# Patient Record
Sex: MALE | Race: WHITE | NOT HISPANIC OR LATINO | Employment: OTHER | RURAL
[De-identification: names, ages, dates, MRNs, and addresses within clinical notes are randomized per-mention and may not be internally consistent; named-entity substitution may affect disease eponyms.]

---

## 2021-12-21 DIAGNOSIS — M54.16 LUMBAR RADICULOPATHY: Primary | ICD-10-CM

## 2021-12-22 ENCOUNTER — HOSPITAL ENCOUNTER (OUTPATIENT)
Dept: RADIOLOGY | Facility: HOSPITAL | Age: 76
Discharge: HOME | End: 2021-12-22
Attending: ORTHOPAEDIC SURGERY
Payer: MEDICARE

## 2021-12-22 ENCOUNTER — OFFICE VISIT (OUTPATIENT)
Dept: SPINE | Facility: CLINIC | Age: 76
End: 2021-12-22
Payer: MEDICARE

## 2021-12-22 VITALS — HEIGHT: 73 IN | BODY MASS INDEX: 28.49 KG/M2 | WEIGHT: 215 LBS

## 2021-12-22 DIAGNOSIS — M54.16 LUMBAR RADICULOPATHY: ICD-10-CM

## 2021-12-22 PROCEDURE — 72110 X-RAY EXAM L-2 SPINE 4/>VWS: CPT | Mod: 26,,, | Performed by: ORTHOPAEDIC SURGERY

## 2021-12-22 PROCEDURE — 72110 X-RAY EXAM L-2 SPINE 4/>VWS: CPT | Mod: TC

## 2021-12-22 PROCEDURE — 72110 XR LUMBAR SPINE 5 VIEW WITH FLEX AND EXT: ICD-10-PCS | Mod: 26,,, | Performed by: ORTHOPAEDIC SURGERY

## 2021-12-22 PROCEDURE — 99204 OFFICE O/P NEW MOD 45 MIN: CPT | Mod: S$PBB,,, | Performed by: ORTHOPAEDIC SURGERY

## 2021-12-22 PROCEDURE — 99204 PR OFFICE/OUTPT VISIT, NEW, LEVL IV, 45-59 MIN: ICD-10-PCS | Mod: S$PBB,,, | Performed by: ORTHOPAEDIC SURGERY

## 2021-12-22 PROCEDURE — 99213 OFFICE O/P EST LOW 20 MIN: CPT | Mod: PBBFAC | Performed by: ORTHOPAEDIC SURGERY

## 2021-12-22 RX ORDER — MECLIZINE HYDROCHLORIDE 25 MG/1
TABLET ORAL
COMMUNITY
Start: 2021-09-01

## 2021-12-22 RX ORDER — ASPIRIN 81 MG/1
81 TABLET ORAL DAILY
COMMUNITY

## 2022-04-25 ENCOUNTER — OFFICE VISIT (OUTPATIENT)
Dept: SPINE | Facility: CLINIC | Age: 77
End: 2022-04-25
Payer: MEDICARE

## 2022-04-25 DIAGNOSIS — M54.16 LUMBAR RADICULOPATHY: Primary | ICD-10-CM

## 2022-04-25 DIAGNOSIS — M51.36 DDD (DEGENERATIVE DISC DISEASE), LUMBAR: ICD-10-CM

## 2022-04-25 PROCEDURE — 99212 OFFICE O/P EST SF 10 MIN: CPT | Mod: PBBFAC | Performed by: ORTHOPAEDIC SURGERY

## 2022-04-25 PROCEDURE — 99214 OFFICE O/P EST MOD 30 MIN: CPT | Mod: S$PBB,,, | Performed by: ORTHOPAEDIC SURGERY

## 2022-04-25 PROCEDURE — 99214 PR OFFICE/OUTPT VISIT, EST, LEVL IV, 30-39 MIN: ICD-10-PCS | Mod: S$PBB,,, | Performed by: ORTHOPAEDIC SURGERY

## 2022-04-27 NOTE — PROGRESS NOTES
MDM/time:  Greater than 30 minutes spent on this encounter including 10 minutes reviewing imaging and notes, 15 minutes with the patient, 5 minutes documentation    ASSESSMENT:  77 y.o. male with lumbar spondylosis    PLAN:  Follow-up as needed at    HPI:  77 y.o. male here for repeat evaluation of low back pain that is nonradiating.  He has completed physical therapy for his lumbar spine.  Reports pain is improved.  Patient reports he injured his back at the age of 17 picking up on a heavy sac of potatoes and has had back issues since that time.  He still tries to say very active playing golf 2 to 3 times a week.  Patient denies decreased  strength.  Denies difficulty with balance no recent falls.  Denies bladder bowel incontinence.  No difficulty walking distances.  Currently takes Aleve and Norco as needed for pain.  Has had epidural injection 1 injection with Dr. House in 2016 that did not improve his pain and he reports he will not go back.  No recent MRI.  History of diskectomy L5-S1 in  with Dr. Dior.  Patient is not a smoker.    [id number] lumbar spine x-ray reviewed showed:  On the AP there is normal coronal alignment.  There are 5 non-rib-bearing lumbar vertebrae.  On the lateral there is decreased lumbar lordosis.  There is spondylotic disease with decreased disc height and osteophyte formation noted.  No fractures or listhesis noted.  No instability on flexion-extension views.    History reviewed. No pertinent past medical history.  History reviewed. No pertinent surgical history.  Social History     Tobacco Use    Smoking status: Never Smoker    Smokeless tobacco: Never Used   Substance Use Topics    Alcohol use: Yes    Drug use: Never      Current Outpatient Medications   Medication Instructions    aspirin (ECOTRIN) 81 mg, Oral, Daily    meclizine (ANTIVERT) 25 mg tablet TAKE 1 TABLET BY MOUTH TWICE A DAY AS NEEDED ..MAY CAUSE DROWSINESS         EXAM:  Constitutional  General Appearance:  There is no height or weight on file to calculate BMI., NAD  Psychiatric   Orientation: Oriented to time, oriented to place, oriented to person  Mood and Affect: Active and alert, normal mood, normal affect  Gait and Station   Appearance:  Normal gait, normal tandem gait, able to walk on toes, able to walk on heels    LUMBAR  Musculoskeletal System   Hips: Normal appearance, no leg length discrepancy, normal motion; left, normal motion; right    Lumbar Spine                   Inspection:  Normal alignment, normal sagittal balance                  Range of motion:  Decreased flexion, extension, lateral bending, rotation. Pain with range of motion                  Bony Palpation of the Lumbar Spine:  No tenderness of the spinous process, no tenderness of the sacrum, no tenderness of the coccyx                  Bony Palpation of the Right Hip:  No tenderness of the iliac crest, no tenderness of the sciatic notch, no tenderness of the SI joint                  Bony Palpation of the Left Hip:  No tenderness of the iliac crest, no tenderness of the sciatic notch, no tenderness of the SI joint                  Soft Tissue Palpation on the Right:  No tenderness of the paraspinal region, no tenderness of the iliolumbar region                  Soft Tissue Palpation on the Left:  No tenderness of the paraspinal region, no tenderness of the iliolumbar region    Motor Strength   L1 Right:  Hip flexion iliopsoas 5/5    L1 Left:  Hip flexion iliopsoas 5/5              L2-L4 Right:  Knee extension quadriceps 5/5, tibialis anterior 5/5              L2-L4 Left:  Knee extension quadriceps 5/5, tibialis anterior 5/5   L5 Right:  Extensor hallucis llongus 5/5,    L5 Left:  Extensor hallucis longus 5/5,    S1 Right:  Plantar flexion gastrocnemius 5/5   S1 Left:  Plantar flexion gastrocnemius 5/5    Neurological System   Ankle Reflex Right:  normal   Ankle Reflex Left: normal   Knee Reflex  Right:  normal   Knee Reflex Left:  normal   Sensation on the Right:  L2 normal, L3 normal, L4 normal, L5 normal, S1 normal   Sensation on the Left:  L2 normal, L3 normal, L4 normal, L5 normal, S1 normal              Special Test on the Right:  Seated straight leg raising test negative, no clonus of the ankle              Special Test on the Left:  Seated straight leg raising test negative, no clonus of the ankle    Skin   Lumbosacral Spine:  Normal skin    Cardiovascular System   Arterial Pulses Right:  Posterior tibialis normal, dorsalis pedis normal   Arterial Pulses Left:  Posterior tibialis normal, dorsalis pedis normal   Edema Right: None   Edema Left:  None

## 2022-10-26 ENCOUNTER — OFFICE VISIT (OUTPATIENT)
Dept: OTOLARYNGOLOGY | Facility: CLINIC | Age: 77
End: 2022-10-26
Payer: MEDICARE

## 2022-10-26 VITALS — BODY MASS INDEX: 28.44 KG/M2 | WEIGHT: 210 LBS | HEIGHT: 72 IN

## 2022-10-26 DIAGNOSIS — J32.9 CHRONIC SINUSITIS, UNSPECIFIED LOCATION: ICD-10-CM

## 2022-10-26 PROCEDURE — 99213 OFFICE O/P EST LOW 20 MIN: CPT | Mod: PBBFAC | Performed by: OTOLARYNGOLOGY

## 2022-10-26 PROCEDURE — 99204 OFFICE O/P NEW MOD 45 MIN: CPT | Mod: S$PBB,,, | Performed by: OTOLARYNGOLOGY

## 2022-10-26 PROCEDURE — 99204 PR OFFICE/OUTPT VISIT, NEW, LEVL IV, 45-59 MIN: ICD-10-PCS | Mod: S$PBB,,, | Performed by: OTOLARYNGOLOGY

## 2022-10-26 NOTE — PROGRESS NOTES
Subjective:       Patient ID: Clint Gonzalez is a 77 y.o. male.    Chief Complaint: Headache (Headaches behind left eye and forehead x 6 weeks for 9-10 hours a day. Pt states headaches are not as bad now, taking po abx. )    Headache   This is a chronic problem. The current episode started more than 1 month ago. The problem occurs daily. The problem has been gradually improving. The pain is located in the Left unilateral region. The pain does not radiate. The pain is severe. Associated symptoms include sinus pressure. Nothing aggravates the symptoms. The treatment provided significant relief.   Review of Systems   HENT:  Positive for sinus pressure and sinus pain.    Neurological:  Positive for headaches.   All other systems reviewed and are negative.    Objective:      Physical Exam  General: NAD  Head: Normocephalic, atraumatic, no facial asymmetry/normal strength,  Ears: Both auricules normal in appearance, w/o deformities tympanic membranes normal external auditory canals normal  Nose: External nose w/o deformities normal turbinates no drainage or inflammation  Oral Cavity: Lips, gums, floor of mouth, tongue hard palate, and buccal mucosa without mass/lesion  Oropharynx: Mucosa pink and moist, soft palate, posterior pharynx and oropharyngeal wall without mass/lesion  Neck: Supple, symmetric, trachea midline, no palpable mass/lesion, no palpable cervical lymphadenopathy  Skin: Warm and dry, no concerning lesions  Respiratory: Respirations even, unlabored   Assessment:       1. Chronic sinusitis, unspecified location          Plan:       MRI reviewed  Sphenoid sinus disease otherwise sinuses clear   Will continue meds   Get f/u CT scan to see if cleared

## 2022-11-07 ENCOUNTER — HOSPITAL ENCOUNTER (OUTPATIENT)
Dept: RADIOLOGY | Facility: HOSPITAL | Age: 77
Discharge: HOME | End: 2022-11-07
Attending: OTOLARYNGOLOGY
Payer: MEDICARE

## 2022-11-07 ENCOUNTER — OFFICE VISIT (OUTPATIENT)
Dept: OTOLARYNGOLOGY | Facility: CLINIC | Age: 77
End: 2022-11-07
Payer: MEDICARE

## 2022-11-07 VITALS — WEIGHT: 210 LBS | BODY MASS INDEX: 28.44 KG/M2 | HEIGHT: 72 IN

## 2022-11-07 DIAGNOSIS — J32.9 CHRONIC SINUSITIS, UNSPECIFIED LOCATION: Primary | ICD-10-CM

## 2022-11-07 DIAGNOSIS — J32.9 CHRONIC SINUSITIS, UNSPECIFIED LOCATION: ICD-10-CM

## 2022-11-07 PROCEDURE — 99214 OFFICE O/P EST MOD 30 MIN: CPT | Mod: S$PBB,,, | Performed by: OTOLARYNGOLOGY

## 2022-11-07 PROCEDURE — 99214 PR OFFICE/OUTPT VISIT, EST, LEVL IV, 30-39 MIN: ICD-10-PCS | Mod: S$PBB,,, | Performed by: OTOLARYNGOLOGY

## 2022-11-07 PROCEDURE — 70486 CT SINUSES WITHOUT CONTRAST: ICD-10-PCS | Mod: 26,,, | Performed by: RADIOLOGY

## 2022-11-07 PROCEDURE — 70486 CT MAXILLOFACIAL W/O DYE: CPT | Mod: 26,,, | Performed by: RADIOLOGY

## 2022-11-07 PROCEDURE — 99213 OFFICE O/P EST LOW 20 MIN: CPT | Mod: PBBFAC,25 | Performed by: OTOLARYNGOLOGY

## 2022-11-07 PROCEDURE — 70486 CT MAXILLOFACIAL W/O DYE: CPT | Mod: TC

## 2022-11-07 RX ORDER — CLINDAMYCIN HYDROCHLORIDE 300 MG/1
300 CAPSULE ORAL 2 TIMES DAILY
Qty: 28 CAPSULE | Refills: 0 | Status: SHIPPED | OUTPATIENT
Start: 2022-11-07

## 2022-11-07 NOTE — PROGRESS NOTES
Subjective:       Patient ID: Clint Gonzalez is a 77 y.o. male.    Chief Complaint: Follow-up (Patient presents for CT sinus results. )    Follow-up  Associated symptoms include congestion.   Review of Systems   HENT:  Positive for congestion, sinus pressure and sinus pain.    Eyes:  Positive for pain.   All other systems reviewed and are negative.    Objective:      Physical Exam  General: NAD  Head: Normocephalic, atraumatic, no facial asymmetry/normal strength,  Ears: Both auricules normal in appearance, w/o deformities tympanic membranes normal external auditory canals normal  Nose: External nose w/o deformities normal turbinates no drainage or inflammation  Oral Cavity: Lips, gums, floor of mouth, tongue hard palate, and buccal mucosa without mass/lesion  Oropharynx: Mucosa pink and moist, soft palate, posterior pharynx and oropharyngeal wall without mass/lesion  Neck: Supple, symmetric, trachea midline, no palpable mass/lesion, no palpable cervical lymphadenopathy  Skin: Warm and dry, no concerning lesions  Respiratory: Respirations even, unlabored   Assessment:       1. Chronic sinusitis, unspecified location          Plan:       CT scan reviewed with Pt confirms MRI just disease in left sphenoid sinus will treat with cleocin for 2 weeks if not better may need  FESS

## 2022-11-21 ENCOUNTER — TELEPHONE (OUTPATIENT)
Dept: OTOLARYNGOLOGY | Facility: CLINIC | Age: 77
End: 2022-11-21
Payer: MEDICARE

## 2022-11-21 DIAGNOSIS — J32.9 CHRONIC SINUSITIS, UNSPECIFIED LOCATION: Primary | ICD-10-CM

## 2022-11-21 RX ORDER — LEVOFLOXACIN 500 MG/1
500 TABLET, FILM COATED ORAL DAILY
Qty: 10 TABLET | Refills: 0 | Status: SHIPPED | OUTPATIENT
Start: 2022-11-21

## 2022-11-28 ENCOUNTER — OFFICE VISIT (OUTPATIENT)
Dept: OTOLARYNGOLOGY | Facility: CLINIC | Age: 77
End: 2022-11-28
Payer: MEDICARE

## 2022-11-28 VITALS — BODY MASS INDEX: 28.44 KG/M2 | WEIGHT: 210 LBS | HEIGHT: 72 IN

## 2022-11-28 DIAGNOSIS — R09.81 CHRONIC NASAL CONGESTION: ICD-10-CM

## 2022-11-28 DIAGNOSIS — R51.9 BILATERAL HEADACHES: ICD-10-CM

## 2022-11-28 DIAGNOSIS — J32.9 CHRONIC SINUSITIS, UNSPECIFIED LOCATION: Primary | ICD-10-CM

## 2022-11-28 PROCEDURE — 99214 PR OFFICE/OUTPT VISIT, EST, LEVL IV, 30-39 MIN: ICD-10-PCS | Mod: S$PBB,,, | Performed by: OTOLARYNGOLOGY

## 2022-11-28 PROCEDURE — 99214 OFFICE O/P EST MOD 30 MIN: CPT | Mod: PBBFAC | Performed by: OTOLARYNGOLOGY

## 2022-11-28 PROCEDURE — 99214 OFFICE O/P EST MOD 30 MIN: CPT | Mod: S$PBB,,, | Performed by: OTOLARYNGOLOGY

## 2022-11-28 RX ORDER — METHYLPREDNISOLONE 4 MG/1
TABLET ORAL
Qty: 21 EACH | Refills: 0 | Status: SHIPPED | OUTPATIENT
Start: 2022-11-28 | End: 2022-12-19

## 2022-11-28 NOTE — PROGRESS NOTES
Subjective:       Patient ID: Clint Gonzalez is a 77 y.o. male.    Chief Complaint: Headache (Patient presents for recurrent headaches. Medications are not helping him currently. )  Taking norco  for pain  Headache   Associated symptoms include eye pain and sinus pressure.   Review of Systems   HENT:  Positive for congestion, sinus pressure and sinus pain.    Eyes:  Positive for pain.   Neurological:  Positive for headaches.   All other systems reviewed and are negative.    Objective:      Physical Exam  General: NAD  Head: Normocephalic, atraumatic, no facial asymmetry/normal strength,  Ears: Both auricules normal in appearance, w/o deformities tympanic membranes normal external auditory canals normal  Nose: External nose w/o deformities normal turbinates no drainage or inflammation  Oral Cavity: Lips, gums, floor of mouth, tongue hard palate, and buccal mucosa without mass/lesion  Oropharynx: Mucosa pink and moist, soft palate, posterior pharynx and oropharyngeal wall without mass/lesion  Neck: Supple, symmetric, trachea midline, no palpable mass/lesion, no palpable cervical lymphadenopathy  Skin: Warm and dry, no concerning lesions  Respiratory: Respirations even, unlabored   Assessment:       1. Chronic sinusitis, unspecified location    2. Chronic nasal congestion    3. Bilateral headaches          Plan:       Medrol   FESS for sphenoid sinus etc

## 2022-12-05 ENCOUNTER — TELEPHONE (OUTPATIENT)
Dept: OTOLARYNGOLOGY | Facility: CLINIC | Age: 77
End: 2022-12-05
Payer: MEDICARE

## 2022-12-06 ENCOUNTER — ANESTHESIA (OUTPATIENT)
Dept: SURGERY | Facility: HOSPITAL | Age: 77
End: 2022-12-06
Payer: MEDICARE

## 2022-12-06 ENCOUNTER — HOSPITAL ENCOUNTER (OUTPATIENT)
Facility: HOSPITAL | Age: 77
Discharge: HOME | End: 2022-12-06
Attending: OTOLARYNGOLOGY | Admitting: OTOLARYNGOLOGY
Payer: MEDICARE

## 2022-12-06 ENCOUNTER — ANESTHESIA EVENT (OUTPATIENT)
Dept: SURGERY | Facility: HOSPITAL | Age: 77
End: 2022-12-06
Payer: MEDICARE

## 2022-12-06 VITALS
SYSTOLIC BLOOD PRESSURE: 127 MMHG | RESPIRATION RATE: 8 BRPM | TEMPERATURE: 97 F | DIASTOLIC BLOOD PRESSURE: 85 MMHG | OXYGEN SATURATION: 100 % | HEART RATE: 102 BPM

## 2022-12-06 VITALS
TEMPERATURE: 98 F | SYSTOLIC BLOOD PRESSURE: 136 MMHG | DIASTOLIC BLOOD PRESSURE: 60 MMHG | BODY MASS INDEX: 28.71 KG/M2 | OXYGEN SATURATION: 91 % | HEART RATE: 77 BPM | WEIGHT: 212 LBS | HEIGHT: 72 IN | RESPIRATION RATE: 16 BRPM

## 2022-12-06 DIAGNOSIS — J32.9 CHRONIC SINUSITIS: ICD-10-CM

## 2022-12-06 DIAGNOSIS — J32.9 CHRONIC SINUSITIS, UNSPECIFIED LOCATION: Primary | ICD-10-CM

## 2022-12-06 PROCEDURE — 31267 ENDOSCOPY MAXILLARY SINUS: CPT | Mod: 50,51,, | Performed by: OTOLARYNGOLOGY

## 2022-12-06 PROCEDURE — 88304 SURGICAL PATHOLOGY: ICD-10-PCS | Mod: 26,,, | Performed by: PATHOLOGY

## 2022-12-06 PROCEDURE — 88312 SPECIAL STAINS GROUP 1: CPT | Mod: 26,,, | Performed by: PATHOLOGY

## 2022-12-06 PROCEDURE — 88304 TISSUE EXAM BY PATHOLOGIST: CPT | Mod: 26,,, | Performed by: PATHOLOGY

## 2022-12-06 PROCEDURE — 27000655: Performed by: NURSE ANESTHETIST, CERTIFIED REGISTERED

## 2022-12-06 PROCEDURE — 31259 PR ENDOSCOPY, NASAL/SINUS, W/ETHMOIDECTOMY/SPHENOIDOTOMY/TISS REMVL: ICD-10-PCS | Mod: 50,,, | Performed by: OTOLARYNGOLOGY

## 2022-12-06 PROCEDURE — 88312 SPECIAL STAINS GROUP 1: CPT | Mod: TC,SUR | Performed by: OTOLARYNGOLOGY

## 2022-12-06 PROCEDURE — 88304 TISSUE EXAM BY PATHOLOGIST: CPT | Mod: TC,SUR | Performed by: OTOLARYNGOLOGY

## 2022-12-06 PROCEDURE — D9220A PRA ANESTHESIA: ICD-10-PCS | Mod: CRNA,,, | Performed by: NURSE ANESTHETIST, CERTIFIED REGISTERED

## 2022-12-06 PROCEDURE — 63600175 PHARM REV CODE 636 W HCPCS: Performed by: NURSE ANESTHETIST, CERTIFIED REGISTERED

## 2022-12-06 PROCEDURE — 27000165 HC TUBE, ETT CUFFED: Performed by: NURSE ANESTHETIST, CERTIFIED REGISTERED

## 2022-12-06 PROCEDURE — 71000015 HC POSTOP RECOV 1ST HR: Performed by: OTOLARYNGOLOGY

## 2022-12-06 PROCEDURE — 37000008 HC ANESTHESIA 1ST 15 MINUTES: Performed by: OTOLARYNGOLOGY

## 2022-12-06 PROCEDURE — 31259 NSL/SINS NDSC SPHN TISS RMVL: CPT | Mod: 50,,, | Performed by: OTOLARYNGOLOGY

## 2022-12-06 PROCEDURE — 27000689 HC BLADE LARYNGOSCOPE ANY SIZE: Performed by: NURSE ANESTHETIST, CERTIFIED REGISTERED

## 2022-12-06 PROCEDURE — D9220A PRA ANESTHESIA: Mod: ANES,,, | Performed by: ANESTHESIOLOGY

## 2022-12-06 PROCEDURE — 31267 PR NASAL/SINUS ENDOSCOPY,RMV TISS MAXILL SINUS: ICD-10-PCS | Mod: 50,51,, | Performed by: OTOLARYNGOLOGY

## 2022-12-06 PROCEDURE — 27000716 HC OXISENSOR PROBE, ANY SIZE: Performed by: NURSE ANESTHETIST, CERTIFIED REGISTERED

## 2022-12-06 PROCEDURE — 88312 SURGICAL PATHOLOGY: ICD-10-PCS | Mod: 26,,, | Performed by: PATHOLOGY

## 2022-12-06 PROCEDURE — D9220A PRA ANESTHESIA: ICD-10-PCS | Mod: ANES,,, | Performed by: ANESTHESIOLOGY

## 2022-12-06 PROCEDURE — 36000709 HC OR TIME LEV III EA ADD 15 MIN: Performed by: OTOLARYNGOLOGY

## 2022-12-06 PROCEDURE — 36000708 HC OR TIME LEV III 1ST 15 MIN: Performed by: OTOLARYNGOLOGY

## 2022-12-06 PROCEDURE — D9220A PRA ANESTHESIA: Mod: CRNA,,, | Performed by: NURSE ANESTHETIST, CERTIFIED REGISTERED

## 2022-12-06 PROCEDURE — 37000009 HC ANESTHESIA EA ADD 15 MINS: Performed by: OTOLARYNGOLOGY

## 2022-12-06 PROCEDURE — 71000033 HC RECOVERY, INTIAL HOUR: Performed by: OTOLARYNGOLOGY

## 2022-12-06 PROCEDURE — 25000003 PHARM REV CODE 250: Performed by: NURSE ANESTHETIST, CERTIFIED REGISTERED

## 2022-12-06 PROCEDURE — 25000003 PHARM REV CODE 250: Performed by: OTOLARYNGOLOGY

## 2022-12-06 PROCEDURE — 27000510 HC BLANKET BAIR HUGGER ANY SIZE: Performed by: NURSE ANESTHETIST, CERTIFIED REGISTERED

## 2022-12-06 RX ORDER — FENTANYL CITRATE 50 UG/ML
INJECTION, SOLUTION INTRAMUSCULAR; INTRAVENOUS
Status: DISCONTINUED | OUTPATIENT
Start: 2022-12-06 | End: 2022-12-06

## 2022-12-06 RX ORDER — ONDANSETRON 2 MG/ML
INJECTION INTRAMUSCULAR; INTRAVENOUS
Status: DISCONTINUED | OUTPATIENT
Start: 2022-12-06 | End: 2022-12-06

## 2022-12-06 RX ORDER — LIDOCAINE HYDROCHLORIDE 20 MG/ML
INJECTION, SOLUTION EPIDURAL; INFILTRATION; INTRACAUDAL; PERINEURAL
Status: DISCONTINUED | OUTPATIENT
Start: 2022-12-06 | End: 2022-12-06

## 2022-12-06 RX ORDER — MEPERIDINE HYDROCHLORIDE 25 MG/ML
25 INJECTION INTRAMUSCULAR; INTRAVENOUS; SUBCUTANEOUS EVERY 10 MIN PRN
Status: DISCONTINUED | OUTPATIENT
Start: 2022-12-06 | End: 2022-12-06 | Stop reason: HOSPADM

## 2022-12-06 RX ORDER — HYDROMORPHONE HYDROCHLORIDE 2 MG/ML
0.5 INJECTION, SOLUTION INTRAMUSCULAR; INTRAVENOUS; SUBCUTANEOUS EVERY 5 MIN PRN
Status: DISCONTINUED | OUTPATIENT
Start: 2022-12-06 | End: 2022-12-06 | Stop reason: HOSPADM

## 2022-12-06 RX ORDER — PROPOFOL 10 MG/ML
VIAL (ML) INTRAVENOUS
Status: DISCONTINUED | OUTPATIENT
Start: 2022-12-06 | End: 2022-12-06

## 2022-12-06 RX ORDER — DEXAMETHASONE SODIUM PHOSPHATE 4 MG/ML
INJECTION, SOLUTION INTRA-ARTICULAR; INTRALESIONAL; INTRAMUSCULAR; INTRAVENOUS; SOFT TISSUE
Status: DISCONTINUED | OUTPATIENT
Start: 2022-12-06 | End: 2022-12-06

## 2022-12-06 RX ORDER — ONDANSETRON 2 MG/ML
4 INJECTION INTRAMUSCULAR; INTRAVENOUS DAILY PRN
Status: DISCONTINUED | OUTPATIENT
Start: 2022-12-06 | End: 2022-12-06 | Stop reason: HOSPADM

## 2022-12-06 RX ORDER — GLYCOPYRROLATE 0.2 MG/ML
INJECTION INTRAMUSCULAR; INTRAVENOUS
Status: DISCONTINUED | OUTPATIENT
Start: 2022-12-06 | End: 2022-12-06

## 2022-12-06 RX ORDER — MIDAZOLAM HYDROCHLORIDE 1 MG/ML
INJECTION INTRAMUSCULAR; INTRAVENOUS
Status: DISCONTINUED | OUTPATIENT
Start: 2022-12-06 | End: 2022-12-06

## 2022-12-06 RX ORDER — OMEPRAZOLE 20 MG/1
20 CAPSULE, DELAYED RELEASE ORAL DAILY
COMMUNITY

## 2022-12-06 RX ORDER — MORPHINE SULFATE 10 MG/ML
4 INJECTION INTRAMUSCULAR; INTRAVENOUS; SUBCUTANEOUS EVERY 5 MIN PRN
Status: DISCONTINUED | OUTPATIENT
Start: 2022-12-06 | End: 2022-12-06 | Stop reason: HOSPADM

## 2022-12-06 RX ORDER — ROCURONIUM BROMIDE 10 MG/ML
INJECTION, SOLUTION INTRAVENOUS
Status: DISCONTINUED | OUTPATIENT
Start: 2022-12-06 | End: 2022-12-06

## 2022-12-06 RX ORDER — DIPHENHYDRAMINE HYDROCHLORIDE 50 MG/ML
25 INJECTION INTRAMUSCULAR; INTRAVENOUS EVERY 6 HOURS PRN
Status: DISCONTINUED | OUTPATIENT
Start: 2022-12-06 | End: 2022-12-06 | Stop reason: HOSPADM

## 2022-12-06 RX ORDER — HYDROCODONE BITARTRATE AND ACETAMINOPHEN 7.5; 325 MG/1; MG/1
1 TABLET ORAL EVERY 6 HOURS PRN
Status: DISCONTINUED | OUTPATIENT
Start: 2022-12-06 | End: 2022-12-06 | Stop reason: HOSPADM

## 2022-12-06 RX ADMIN — ONDANSETRON 4 MG: 2 INJECTION INTRAMUSCULAR; INTRAVENOUS at 08:12

## 2022-12-06 RX ADMIN — ROCURONIUM BROMIDE 50 MG: 10 INJECTION, SOLUTION INTRAVENOUS at 08:12

## 2022-12-06 RX ADMIN — FENTANYL CITRATE 50 MCG: 50 INJECTION INTRAMUSCULAR; INTRAVENOUS at 08:12

## 2022-12-06 RX ADMIN — SUGAMMADEX 200 MG: 100 INJECTION, SOLUTION INTRAVENOUS at 08:12

## 2022-12-06 RX ADMIN — PROPOFOL 150 MG: 10 INJECTION, EMULSION INTRAVENOUS at 08:12

## 2022-12-06 RX ADMIN — SODIUM CHLORIDE, POTASSIUM CHLORIDE, SODIUM LACTATE AND CALCIUM CHLORIDE: 600; 310; 30; 20 INJECTION, SOLUTION INTRAVENOUS at 08:12

## 2022-12-06 RX ADMIN — GLYCOPYRROLATE 0.2 MG: 0.2 INJECTION INTRAMUSCULAR; INTRAVENOUS at 08:12

## 2022-12-06 RX ADMIN — LIDOCAINE HYDROCHLORIDE 40 MG: 20 INJECTION, SOLUTION INTRAVENOUS at 08:12

## 2022-12-06 RX ADMIN — DEXAMETHASONE SODIUM PHOSPHATE 20 MG: 4 INJECTION, SOLUTION INTRA-ARTICULAR; INTRALESIONAL; INTRAMUSCULAR; INTRAVENOUS; SOFT TISSUE at 08:12

## 2022-12-06 RX ADMIN — MIDAZOLAM HYDROCHLORIDE 2 MG: 1 INJECTION, SOLUTION INTRAMUSCULAR; INTRAVENOUS at 08:12

## 2022-12-06 NOTE — ANESTHESIA PREPROCEDURE EVALUATION
12/06/2022  Clint Gonzalez is a 77 y.o., male.      Pre-op Assessment    I have reviewed the Patient Summary Reports.    I have reviewed the NPO Status.   I have reviewed the Medications.     Review of Systems         Anesthesia Plan  Type of Anesthesia, risks & benefits discussed:    Anesthesia Type: Gen ETT  Intra-op Monitoring Plan: Standard ASA Monitors  Post Op Pain Control Plan: IV/PO Opioids PRN  Induction:  IV  Informed Consent: Informed consent signed with the Patient and all parties understand the risks and agree with anesthesia plan.  All questions answered.   ASA Score: 1    Ready For Surgery From Anesthesia Perspective.     .  NPO greater than 8 hours  PONV  Allergic to PCN    GERD    Airway exam deferred (COVID precautions); adequate ROM at neck.

## 2022-12-06 NOTE — BRIEF OP NOTE
[facility] - Periop Services  Brief Operative Note    Surgery Date: 12/6/2022     Surgeon(s) and Role:     * Federico Brooks MD - Primary    Assisting Surgeon: None    Pre-op Diagnosis:  Chronic sinusitis, unspecified location [J32.9]    Post-op Diagnosis:  Post-Op Diagnosis Codes:     * Chronic sinusitis, unspecified location [J32.9]    Procedure(s) (LRB):  FESS (FUNCTIONAL ENDOSCOPIC SINUS SURGERY) (Bilateral)    Anesthesia: General    Operative Findings: Fungal sinusitis    Estimated Blood Loss: 10 ml         Specimens:   Specimen (24h ago, onward)       Start     Ordered    12/06/22 0846  Surgical Pathology  RELEASE UPON ORDERING         12/06/22 0846                      Discharge Note    OUTCOME: Patient tolerated treatment/procedure well without complication and is now ready for discharge.    DISPOSITION: Home or Self Care    FINAL DIAGNOSIS:  Chronic sinusitis  FOLLOWUP: In clinic    DISCHARGE INSTRUCTIONS:  No discharge procedures on file.

## 2022-12-06 NOTE — OP NOTE
Surgery Date: 12/6/2022     Surgeon(s) and Role:     * Federico Brooks MD - Primary    Assisting Surgeon: None    Pre-op Diagnosis:  Chronic sinusitis, unspecified location [J32.9]    Post-op Diagnosis:  Post-Op Diagnosis Codes:     * Chronic sinusitis, unspecified location [J32.9]    Procedure(s) (LRB):  FESS (FUNCTIONAL ENDOSCOPIC SINUS SURGERY) (Bilateral)    After general ET anesthesia the patient was decongested with neosynephrine soaked patties the 0 and 30 degree scopes were used to view the sinuses the left side was performed first after compressing nasir bullosa using the scope for visualization the ethmoid bulla was opened thickened mucosal disease was removed from the posterior cells coming forward to clear anterior cells as needed with care to stay medial to lamina papyracea next staying medial to middle turbinate the sphenoid sinus natural opening was enlarged and fungal debris removed and sent to pathology Next the maxillary sinus was entered through it's natural ostium which was enlarged and thickened debris removed from opening and inside the sinus it was thoroughly irrigated with saline. The right side was done in a similar manner. There was no further bleeding the patient was reversed taken to RR in stable condition.     Anesthesia: General    Operative Findings: Fungal sinusitis    Estimated Blood Loss: 10 ml   Rx Authorization:    Requested Medication/ Dose: Diazepam 2mg tabs    Date last refill ordered: 6/8/20    Quantity ordered: 90 tabs    # refills: 5    Date of last clinic visit with ordering provider: 5/14/20    Date of next clinic visit with ordering provider: 11/12/20    All pertinent protocol data (lab date/result):     Include pertinent information from patients message:

## 2022-12-06 NOTE — TRANSFER OF CARE
Anesthesia Transfer of Care Note    Patient: Clint Gonzalez    Procedure(s) Performed: Procedure(s) (LRB):  FESS (FUNCTIONAL ENDOSCOPIC SINUS SURGERY) (Bilateral)    Patient location: PACU    Anesthesia Type: general    Transport from OR: Transported from OR on 6-10 L/min O2 by face mask with adequate spontaneous ventilation    Post pain: adequate analgesia    Post assessment: no apparent anesthetic complications    Post vital signs: stable    Level of consciousness: responds to stimulation and awake    Nausea/Vomiting: no nausea/vomiting    Complications: none    Transfer of care protocol was followedComments: Good SV continue, NAD noted, VSS, RTRN      Last vitals:   Visit Vitals  BP (!) 157/78 (BP Location: Right arm, Patient Position: Lying)   Pulse 96   Temp 36.7 °C (98.1 °F) (Oral)   Resp 15   Ht 6' (1.829 m)   Wt 96.2 kg (212 lb)   SpO2 96%   BMI 28.75 kg/m²

## 2022-12-06 NOTE — ANESTHESIA POSTPROCEDURE EVALUATION
Anesthesia Post Evaluation    Patient: Clint Gonzalez    Procedure(s) Performed: Procedure(s) (LRB):  FESS (FUNCTIONAL ENDOSCOPIC SINUS SURGERY) (Bilateral)    Final Anesthesia Type: general      Patient location during evaluation: PACU  Patient participation: Yes- Able to Participate  Level of consciousness: awake and sedated  Post-procedure vital signs: reviewed and stable  Pain management: adequate  Airway patency: patent    PONV status at discharge: No PONV  Anesthetic complications: no      Cardiovascular status: blood pressure returned to baseline  Respiratory status: unassisted  Hydration status: euvolemic  Follow-up not needed.          Vitals Value Taken Time   /60 12/06/22 1016   Temp 36.7 °C (98.1 °F) 12/06/22 0857   Pulse 79 12/06/22 1020   Resp 16 12/06/22 1015   SpO2 93 % 12/06/22 1020   Vitals shown include unvalidated device data.      Event Time   Out of Recovery 09:25:00         Pain/Kishore Score: Kishore Score: 10 (12/6/2022 10:30 AM)

## 2022-12-06 NOTE — ANESTHESIA PROCEDURE NOTES
Intubation    Date/Time: 12/6/2022 8:33 AM  Performed by: Cesar Oviedo CRNA  Authorized by: Maxi Donovan MD     Intubation:     Induction:  Intravenous    Intubated:  Postinduction    Mask Ventilation:  Easy mask    Attempts:  1    Attempted By:  CRNA    Method of Intubation:  Direct    Blade:  Saleem 4    Laryngeal View Grade: Grade I - full view of cords      Difficult Airway Encountered?: No      Complications:  None    Airway Device:  Oral endotracheal tube    Airway Device Size:  7.5    Style/Cuff Inflation:  Cuffed    Tube secured:  22    Secured at:  The teeth    Placement Verified By:  Capnometry    Complicating Factors:  Anterior larynx    Findings Post-Intubation:  BS equal bilateral  Notes:      Smooth, tolerated well

## 2022-12-06 NOTE — OR NURSING
0855 REC'D PT TO PACU ASLEEP. NAD NOTED. RESP EVEN & UNLABORBED. O2 SAT 96% ON 5L/FM. WILL TITRATE AS NEEDED. VSS. NO C/O PAIN VOICED AT THIS TIME. DSG TO NOSE D/I. L HAND 20G INTACT & INFUSING IVF W/O SIGNS OF INFILTRATION. WILL CONT TO MONITOR.     0912 UPDATED WIFE ON PT STATUS VIA TEXT MESSAGE.     0935 RETURNED PT TO ROOM # 17 & RELEASED TO JOSE DANIEL ABREU RN IN STABLE CONDITON. /62 HR 89 O2 SAT 93% ON RA.

## 2022-12-08 LAB
ESTROGEN SERPL-MCNC: NORMAL PG/ML
INSULIN SERPL-ACNC: NORMAL U[IU]/ML
LAB AP GROSS DESCRIPTION: NORMAL
LAB AP LABORATORY NOTES: NORMAL
T3RU NFR SERPL: NORMAL %

## 2022-12-15 ENCOUNTER — OFFICE VISIT (OUTPATIENT)
Dept: OTOLARYNGOLOGY | Facility: CLINIC | Age: 77
End: 2022-12-15
Payer: MEDICARE

## 2022-12-15 VITALS — WEIGHT: 212 LBS | HEIGHT: 72 IN | BODY MASS INDEX: 28.71 KG/M2

## 2022-12-15 DIAGNOSIS — J32.9 CHRONIC SINUSITIS, UNSPECIFIED LOCATION: Primary | ICD-10-CM

## 2022-12-15 PROCEDURE — 99213 OFFICE O/P EST LOW 20 MIN: CPT | Mod: PBBFAC | Performed by: OTOLARYNGOLOGY

## 2022-12-15 PROCEDURE — 99213 OFFICE O/P EST LOW 20 MIN: CPT | Mod: S$PBB,,, | Performed by: OTOLARYNGOLOGY

## 2022-12-15 PROCEDURE — 99213 PR OFFICE/OUTPT VISIT, EST, LEVL III, 20-29 MIN: ICD-10-PCS | Mod: S$PBB,,, | Performed by: OTOLARYNGOLOGY

## 2022-12-15 NOTE — PROGRESS NOTES
Subjective:       Patient ID: Clint Gonzalez is a 77 y.o. male.    Chief Complaint: Follow-up (Patient is a post op appt for a fess. He states he is doing well. )    Follow-up  Pertinent negatives include no congestion or headaches.   Review of Systems   HENT:  Negative for congestion.    Neurological:  Negative for headaches.   All other systems reviewed and are negative.    Objective:      Physical Exam  General: NAD  Head: Normocephalic, atraumatic, no facial asymmetry/normal strength,  Ears: Both auricules normal in appearance, w/o deformities tympanic membranes normal external auditory canals normal  Nose: External nose w/o deformities Healing well  Oral Cavity: Lips, gums, floor of mouth, tongue hard palate, and buccal mucosa without mass/lesion  Oropharynx: Mucosa pink and moist, soft palate, posterior pharynx and oropharyngeal wall without mass/lesion  Neck: Supple, symmetric, trachea midline, no palpable mass/lesion, no palpable cervical lymphadenopathy  Skin: Warm and dry, no concerning lesions  Respiratory: Respirations even, unlabored   Assessment:       1. Chronic sinusitis, unspecified location        Plan:       F/u 3 months     
family/significant other/patient/daughter, chart and RN

## 2023-03-08 ENCOUNTER — OFFICE VISIT (OUTPATIENT)
Dept: OTOLARYNGOLOGY | Facility: CLINIC | Age: 78
End: 2023-03-08
Payer: MEDICARE

## 2023-03-08 VITALS — HEIGHT: 72 IN | WEIGHT: 212 LBS | BODY MASS INDEX: 28.71 KG/M2

## 2023-03-08 DIAGNOSIS — J32.8 OTHER CHRONIC SINUSITIS: Primary | ICD-10-CM

## 2023-03-08 PROCEDURE — 99213 OFFICE O/P EST LOW 20 MIN: CPT | Mod: PBBFAC | Performed by: OTOLARYNGOLOGY

## 2023-03-08 PROCEDURE — 99213 OFFICE O/P EST LOW 20 MIN: CPT | Mod: S$PBB,,, | Performed by: OTOLARYNGOLOGY

## 2023-03-08 PROCEDURE — 99213 PR OFFICE/OUTPT VISIT, EST, LEVL III, 20-29 MIN: ICD-10-PCS | Mod: S$PBB,,, | Performed by: OTOLARYNGOLOGY

## 2023-03-08 NOTE — PROGRESS NOTES
Subjective:       Patient ID: Clint Gonzalez is a 78 y.o. male.    Chief Complaint: Sinusitis (3 month follow-up on sinuses. Pt denies any problems with sinuses since FESS. )    Sinusitis  Pertinent negatives include no congestion or sinus pressure.   Review of Systems   HENT:  Negative for congestion, sinus pressure and sinus pain.    All other systems reviewed and are negative.    Objective:      Physical Exam  General: NAD  Head: Normocephalic, atraumatic, no facial asymmetry/normal strength,  Ears: Both auricules normal in appearance, w/o deformities tympanic membranes normal external auditory canals normal  Nose: External nose w/o deformities normal turbinates no drainage or inflammation  Oral Cavity: Lips, gums, floor of mouth, tongue hard palate, and buccal mucosa without mass/lesion  Oropharynx: Mucosa pink and moist, soft palate, posterior pharynx and oropharyngeal wall without mass/lesion  Neck: Supple, symmetric, trachea midline, no palpable mass/lesion, no palpable cervical lymphadenopathy  Skin: Warm and dry, no concerning lesions  Respiratory: Respirations even, unlabored   Assessment:       1. Other chronic sinusitis        Plan:       Resolved for now f/u prn

## 2024-09-04 ENCOUNTER — OFFICE VISIT (OUTPATIENT)
Dept: OTOLARYNGOLOGY | Facility: CLINIC | Age: 79
End: 2024-09-04
Payer: MEDICARE

## 2024-09-04 VITALS — HEIGHT: 72 IN | BODY MASS INDEX: 28.71 KG/M2 | WEIGHT: 212 LBS

## 2024-09-04 DIAGNOSIS — R09.81 CHRONIC NASAL CONGESTION: Primary | ICD-10-CM

## 2024-09-04 DIAGNOSIS — J32.8 OTHER CHRONIC SINUSITIS: ICD-10-CM

## 2024-09-04 PROCEDURE — 99214 OFFICE O/P EST MOD 30 MIN: CPT | Mod: S$PBB,,, | Performed by: OTOLARYNGOLOGY

## 2024-09-04 PROCEDURE — 99999 PR PBB SHADOW E&M-EST. PATIENT-LVL III: CPT | Mod: PBBFAC,,, | Performed by: OTOLARYNGOLOGY

## 2024-09-04 PROCEDURE — 99213 OFFICE O/P EST LOW 20 MIN: CPT | Mod: PBBFAC | Performed by: OTOLARYNGOLOGY

## 2024-09-04 RX ORDER — CLINDAMYCIN HYDROCHLORIDE 300 MG/1
300 CAPSULE ORAL 2 TIMES DAILY
Qty: 28 CAPSULE | Refills: 0 | Status: SHIPPED | OUTPATIENT
Start: 2024-09-04

## 2024-09-04 NOTE — PROGRESS NOTES
Subjective:       Patient ID: Clint Gonzalez is a 79 y.o. male.    Chief Complaint: Headache (Pressure behind the left eye. Pt states he has a h/o FESS about 2 years ago and now his same symptoms have started again. )    Headache   Associated symptoms include ear pain and sinus pressure.     Review of Systems   HENT:  Positive for ear pain, sinus pressure and sinus pain.    Neurological:  Positive for headaches.   All other systems reviewed and are negative.      Objective:      Physical Exam  General: NAD  Head: Normocephalic, atraumatic, no facial asymmetry/normal strength,  Ears: Both auricules normal in appearance, w/o deformities tympanic membranes normal external auditory canals normal  Nose: External nose w/o deformities congested turbinates no drainage or inflammation  Oral Cavity: Lips, gums, floor of mouth, tongue hard palate, and buccal mucosa without mass/lesion  Oropharynx: Mucosa pink and moist, soft palate, posterior pharynx and oropharyngeal wall without mass/lesion  Neck: Supple, symmetric, trachea midline, no palpable mass/lesion, no palpable cervical lymphadenopathy  Skin: Warm and dry, no concerning lesions  Respiratory: Respirations even, unlabored  Assessment:       1. Chronic nasal congestion    2. Other chronic sinusitis        Plan:       Had fungal sinusitis 2 years ago hopefully has not come back  Cleocin f/u 2 weeks if not better will need CT sinus

## 2024-09-17 ENCOUNTER — TELEPHONE (OUTPATIENT)
Dept: OTOLARYNGOLOGY | Facility: CLINIC | Age: 79
End: 2024-09-17
Payer: MEDICARE

## 2024-09-17 DIAGNOSIS — R51.9 BILATERAL HEADACHES: Primary | ICD-10-CM

## 2024-09-17 DIAGNOSIS — J32.9 CHRONIC SINUSITIS, UNSPECIFIED LOCATION: Primary | ICD-10-CM

## 2024-09-17 DIAGNOSIS — R09.81 CHRONIC NASAL CONGESTION: ICD-10-CM

## 2024-09-17 DIAGNOSIS — J32.9 CHRONIC SINUSITIS, UNSPECIFIED LOCATION: ICD-10-CM

## 2024-09-17 RX ORDER — LEVOFLOXACIN 500 MG/1
500 TABLET, FILM COATED ORAL DAILY
Qty: 10 TABLET | Refills: 0 | Status: SHIPPED | OUTPATIENT
Start: 2024-09-17

## 2024-09-17 RX ORDER — IBUPROFEN 800 MG/1
800 TABLET ORAL EVERY 6 HOURS PRN
Qty: 30 TABLET | Refills: 0 | Status: SHIPPED | OUTPATIENT
Start: 2024-09-17

## 2024-09-23 ENCOUNTER — HOSPITAL ENCOUNTER (OUTPATIENT)
Dept: RADIOLOGY | Facility: HOSPITAL | Age: 79
Discharge: HOME | End: 2024-09-23
Attending: OTOLARYNGOLOGY
Payer: MEDICARE

## 2024-09-23 ENCOUNTER — OFFICE VISIT (OUTPATIENT)
Dept: OTOLARYNGOLOGY | Facility: CLINIC | Age: 79
End: 2024-09-23
Payer: MEDICARE

## 2024-09-23 VITALS — HEIGHT: 72 IN | WEIGHT: 212 LBS | BODY MASS INDEX: 28.71 KG/M2

## 2024-09-23 DIAGNOSIS — J32.9 CHRONIC SINUSITIS, UNSPECIFIED LOCATION: ICD-10-CM

## 2024-09-23 DIAGNOSIS — R09.81 CHRONIC NASAL CONGESTION: ICD-10-CM

## 2024-09-23 DIAGNOSIS — J32.9 CHRONIC SINUSITIS, UNSPECIFIED LOCATION: Primary | ICD-10-CM

## 2024-09-23 DIAGNOSIS — R51.9 BILATERAL HEADACHES: ICD-10-CM

## 2024-09-23 PROCEDURE — 70486 CT MAXILLOFACIAL W/O DYE: CPT | Mod: TC

## 2024-09-23 PROCEDURE — 99213 OFFICE O/P EST LOW 20 MIN: CPT | Mod: PBBFAC,25 | Performed by: OTOLARYNGOLOGY

## 2024-09-23 PROCEDURE — 99999 PR PBB SHADOW E&M-EST. PATIENT-LVL III: CPT | Mod: PBBFAC,,, | Performed by: OTOLARYNGOLOGY

## 2024-09-23 PROCEDURE — 70486 CT MAXILLOFACIAL W/O DYE: CPT | Mod: 26,,, | Performed by: RADIOLOGY

## 2024-09-23 PROCEDURE — 99214 OFFICE O/P EST MOD 30 MIN: CPT | Mod: S$PBB,,, | Performed by: OTOLARYNGOLOGY

## 2024-09-23 RX ORDER — METHYLPREDNISOLONE 4 MG/1
TABLET ORAL
Qty: 1 EACH | Refills: 0 | Status: SHIPPED | OUTPATIENT
Start: 2024-09-23

## 2024-09-23 NOTE — PROGRESS NOTES
Subjective:       Patient ID: Clint Gonzalez is a 79 y.o. male.    Chief Complaint: Sinusitis (CT Sinus results. )    Sinusitis  Associated symptoms include congestion, headaches and sinus pressure.     Review of Systems   HENT:  Positive for congestion, sinus pressure and sinus pain.    Neurological:  Positive for headaches.   All other systems reviewed and are negative.      Objective:      Physical Exam  General: NAD  Head: Normocephalic, atraumatic, no facial asymmetry/normal strength,  Ears: Both auricules normal in appearance, w/o deformities tympanic membranes normal external auditory canals normal  Nose: External nose w/o deformities congested  turbinates no drainage or inflammation  Oral Cavity: Lips, gums, floor of mouth, tongue hard palate, and buccal mucosa without mass/lesion  Oropharynx: Mucosa pink and moist, soft palate, posterior pharynx and oropharyngeal wall without mass/lesion  Neck: Supple, symmetric, trachea midline, no palpable mass/lesion, no palpable cervical lymphadenopathy  Skin: Warm and dry, no concerning lesions  Respiratory: Respirations even, unlabored  Assessment:       1. Chronic sinusitis, unspecified location    2. Bilateral headaches    3. Chronic nasal congestion        Plan:       Ct reviewed with pt left sphenoid sinus disease  Will need FESS again  Trial medrol with antibiotic   F/u 1 week

## 2024-09-30 ENCOUNTER — OFFICE VISIT (OUTPATIENT)
Dept: OTOLARYNGOLOGY | Facility: CLINIC | Age: 79
End: 2024-09-30
Payer: MEDICARE

## 2024-09-30 VITALS — WEIGHT: 212 LBS | BODY MASS INDEX: 28.71 KG/M2 | HEIGHT: 72 IN

## 2024-09-30 DIAGNOSIS — R51.9 BILATERAL HEADACHES: ICD-10-CM

## 2024-09-30 DIAGNOSIS — R09.81 CHRONIC NASAL CONGESTION: ICD-10-CM

## 2024-09-30 DIAGNOSIS — J32.9 CHRONIC SINUSITIS, UNSPECIFIED LOCATION: Primary | ICD-10-CM

## 2024-09-30 PROCEDURE — 99999 PR PBB SHADOW E&M-EST. PATIENT-LVL III: CPT | Mod: PBBFAC,,, | Performed by: OTOLARYNGOLOGY

## 2024-09-30 PROCEDURE — 99214 OFFICE O/P EST MOD 30 MIN: CPT | Mod: S$PBB,,, | Performed by: OTOLARYNGOLOGY

## 2024-09-30 PROCEDURE — 99213 OFFICE O/P EST LOW 20 MIN: CPT | Mod: PBBFAC | Performed by: OTOLARYNGOLOGY

## 2024-10-28 ENCOUNTER — OFFICE VISIT (OUTPATIENT)
Dept: OTOLARYNGOLOGY | Facility: CLINIC | Age: 79
End: 2024-10-28
Payer: MEDICARE

## 2024-10-28 VITALS — HEIGHT: 72 IN | BODY MASS INDEX: 28.71 KG/M2 | WEIGHT: 212 LBS

## 2024-10-28 DIAGNOSIS — R51.9 BILATERAL HEADACHES: ICD-10-CM

## 2024-10-28 DIAGNOSIS — J32.9 CHRONIC SINUSITIS, UNSPECIFIED LOCATION: Primary | ICD-10-CM

## 2024-10-28 PROCEDURE — 99213 OFFICE O/P EST LOW 20 MIN: CPT | Mod: PBBFAC | Performed by: OTOLARYNGOLOGY

## 2024-10-28 PROCEDURE — 99999 PR PBB SHADOW E&M-EST. PATIENT-LVL III: CPT | Mod: PBBFAC,,, | Performed by: OTOLARYNGOLOGY

## 2024-10-28 PROCEDURE — 99214 OFFICE O/P EST MOD 30 MIN: CPT | Mod: S$PBB,,, | Performed by: OTOLARYNGOLOGY

## 2024-10-28 RX ORDER — CLINDAMYCIN HYDROCHLORIDE 300 MG/1
300 CAPSULE ORAL 2 TIMES DAILY
Qty: 28 CAPSULE | Refills: 0 | Status: SHIPPED | OUTPATIENT
Start: 2024-10-28

## 2024-10-28 RX ORDER — METHYLPREDNISOLONE 4 MG/1
TABLET ORAL
Qty: 1 EACH | Refills: 0 | Status: SHIPPED | OUTPATIENT
Start: 2024-10-28

## 2024-10-30 ENCOUNTER — TELEPHONE (OUTPATIENT)
Dept: OTOLARYNGOLOGY | Facility: CLINIC | Age: 79
End: 2024-10-30
Payer: MEDICARE

## (undated) DEVICE — GLOVE PROTEXIS PI SYN SURG 6.0

## (undated) DEVICE — CLEANER MIRROR QT (MIN / 12) CLEAR DIP 16 OZ

## (undated) DEVICE — GLOVE PROTEXIS PI SYN SURG 7.5

## (undated) DEVICE — SYR 10CC LUER LOCK

## (undated) DEVICE — WIPE DELICATE TASK LAB 4X8IN

## (undated) DEVICE — SPONGE CODMAN SURG NEUR .5X1.5

## (undated) DEVICE — SOL NACL IRR 1000ML BTL

## (undated) DEVICE — Device